# Patient Record
(demographics unavailable — no encounter records)

---

## 2025-05-09 NOTE — PHYSICAL EXAM
[Alert] : alert [No Acute Distress] : no acute distress [Normal Affect] : the affect was normal [Normal Mood] : the mood was normal [Normal Hearing] : hearing was normal [No LAD] : no lymphadenopathy [Thyroid Not Enlarged] : the thyroid was not enlarged [Clear to Auscultation] : lungs were clear to auscultation bilaterally [Normal S1, S2] : normal S1 and S2 [Regular Rhythm] : with a regular rhythm [No Edema] : no peripheral edema [Acanthosis Nigricans] : no acanthosis nigricans [Foot Ulcers] : no foot ulcers [de-identified] : fingerstick 189 [de-identified] : 1+ DP pulses

## 2025-05-09 NOTE — HISTORY OF PRESENT ILLNESS
[FreeTextEntry1] : No new health issues since last visit, Sept 2024. He reports morning sugars ranging 170-180, and 200-210, pre dinner/after getting home from work. He has not had hypoglycemia.  One morning, when he had not been feeling well, sugar was around 100 and he had weird craving for applesauce. He has nausea for 2 days following Ozempic injection but it is tolerable.   Some days, eating crackers to dampen nausea. He walks 45 minutes after dinner. no nocturia, SOB, chest pain, or neuropathy symptoms  He sees is up to date with ophtho, eyes are fine. A1c today is 7.7% He will see Dr Escobar next week for annual visit (labs to be done)  Meds metformin 1g bid glipizide 10mg bid, Ozempic 1mg/week triamterene HCTZ, valsartan 320mg amlodipine/ atorvastatin  10/20,  atorvastatin 40mg Prev meds:  Trulicity (changed to Ozempic)

## 2025-05-09 NOTE — DATA REVIEWED
[FreeTextEntry1] : 9/24  A1c 8.0% 5/24  A1c 9.8%, tot chol 171, trig 197, HDL 38, LDL 99, urine alb/cr 26, GFR 78 5/23 A1c  8.8% POC 1/23  A1c 9.8% 11/22  A1c 10.5%,  tot chol 197, trig 246, HDL 40, , TSH 0.89

## 2025-05-09 NOTE — ASSESSMENT
[FreeTextEntry1] : Diabetes, sugars improving but still not yet at goal.   No known complications.  Obesity BMI now below 30. He will find out if Mounjaro is covered;  if it is covered, change to Mounjaro 5mg/week. If not covered, titrate to Ozempic 2mg/week, ok to titrate slowly (estimate a 1.5mg dose by reducing 15-20 clicks on the 2mg pen). Continue glipizide 10mg bid and metformin 1g bid.  I recommended that he alternate testing times between morning (goal < 140), pre dinner, and bedtime (goal < 180). continue regular walking continue statin therapy RTO 4-6 months

## 2025-05-19 NOTE — HISTORY OF PRESENT ILLNESS
[FreeTextEntry1] : Annual wellness visit [de-identified] : Pt here for annual wellness visit. Sees Dr. Patiño.  Had been on Trulicity.  Switched to Ozempic.  Just recently.  May be stopping glipizide. Still taking multiple BP meds. Sees derm. On cholesterol med.  Both the combo and the stand alone atorvastatin for a total of 60mg daily. Walks for exercise.  Sometimes weights at the gym. Didn't get COVID shot in the fall (had COVID infection in July 2024).  Had flu shot. Got shingles vaccines last year. Thinks last Tdap shot was when traveling to [facility].  Over 10 years ago. No risk for STIs.  Declines testing. Never had colonoscopy.

## 2025-05-19 NOTE — HEALTH RISK ASSESSMENT
[Very Good] : ~his/her~  mood as very good [1 or 2 (0 pts)] : 1 or 2 (0 points) [Never (0 pts)] : Never (0 points) [No falls in past year] : Patient reported no falls in the past year [0] : 2) Feeling down, depressed, or hopeless: Not at all (0) [Yes] : takes [None] : Patient does not have any barriers to medication adherence [Never] : Never [NO] : No [With Family] : lives with family [Employed] : employed [] :  [Sexually Active] : sexually active [Feels Safe at Home] : Feels safe at home [Designated Healthcare Proxy] : Designated healthcare proxy [Name: ___] : Health Care Proxy's Name: [unfilled]  [Relationship: ___] : Relationship: [unfilled] [de-identified] : [employer] [de-identified] : PATIENT WALKS [de-identified] : REGULAR DIET [[id number]] : 0 [High Risk Behavior] : no high risk behavior [ColonoscopyComments] : NOT DONE  [de-identified] :  [FreeTextEntry2] : [employer] [AdvancecareDate] : 05/25

## 2025-05-19 NOTE — HEALTH RISK ASSESSMENT
[Very Good] : ~his/her~  mood as very good [1 or 2 (0 pts)] : 1 or 2 (0 points) [Never (0 pts)] : Never (0 points) [No falls in past year] : Patient reported no falls in the past year [0] : 2) Feeling down, depressed, or hopeless: Not at all (0) [Yes] : takes [None] : Patient does not have any barriers to medication adherence [Never] : Never [NO] : No [With Family] : lives with family [Employed] : employed [] :  [Sexually Active] : sexually active [Feels Safe at Home] : Feels safe at home [Designated Healthcare Proxy] : Designated healthcare proxy [Name: ___] : Health Care Proxy's Name: [unfilled]  [Relationship: ___] : Relationship: [unfilled] [de-identified] : [employer] [de-identified] : PATIENT WALKS [de-identified] : REGULAR DIET [[id number]] : 0 [High Risk Behavior] : no high risk behavior [ColonoscopyComments] : NOT DONE  [de-identified] :  [FreeTextEntry2] : [facility] [employer] [employer] [AdvancecareDate] : 05/25

## 2025-05-19 NOTE — HISTORY OF PRESENT ILLNESS
[FreeTextEntry1] : Annual wellness visit [de-identified] : Pt here for annual wellness visit. Sees Dr. Patiño.  Had been on Trulicity.  Switched to Ozempic.  Just recently.  May be stopping glipizide. Still taking multiple BP meds. Sees derm. On cholesterol med.  Both the combo and the stand alone atorvastatin for a total of 60mg daily. Walks for exercise.  Sometimes weights at the gym. Didn't get COVID shot in the fall (had COVID infection in July 2024).  Had flu shot. Got shingles vaccines last year. Thinks last Tdap shot was when traveling to [facility].  Over 10 years ago. No risk for STIs.  Declines testing. Never had colonoscopy.

## 2025-05-19 NOTE — ASSESSMENT
[Vaccines Reviewed] : Immunizations reviewed today. Please see immunization details in the vaccine log within the immunization flowsheet.  [FreeTextEntry1] : Agrees to go for colonoscopy.  Appt set. Check PSA in blood today. Needs Tdap and PCV to be given today. Diet/exercise reviewed. Continue to see derm as directed by derm.